# Patient Record
Sex: FEMALE | Race: WHITE | NOT HISPANIC OR LATINO | URBAN - NONMETROPOLITAN AREA
[De-identification: names, ages, dates, MRNs, and addresses within clinical notes are randomized per-mention and may not be internally consistent; named-entity substitution may affect disease eponyms.]

---

## 2017-08-25 ENCOUNTER — NEW PATIENT (OUTPATIENT)
Dept: URBAN - NONMETROPOLITAN AREA CLINIC 6 | Facility: CLINIC | Age: 47
End: 2017-08-25
Payer: COMMERCIAL

## 2017-08-25 DIAGNOSIS — H00.021 HORDEOLUM INTERNUM (MEIBOMIAN GLAND DYSFUNCTION), RIGHT UPPER LID: ICD-10-CM

## 2017-08-25 DIAGNOSIS — H00.024 HORDEOLUM INTERNUM (MEIBOMIAN GLAND DYSFUNCTION), LEFT UPPER LID: ICD-10-CM

## 2017-08-25 PROCEDURE — 92015 DETERMINE REFRACTIVE STATE: CPT | Performed by: OPTOMETRIST

## 2017-08-25 PROCEDURE — 92004 COMPRE OPH EXAM NEW PT 1/>: CPT | Performed by: OPTOMETRIST

## 2017-08-25 ASSESSMENT — VISUAL ACUITY
OS: 20/20
OD: 20/20

## 2017-08-25 ASSESSMENT — INTRAOCULAR PRESSURE
OD: 16
OS: 16

## 2017-08-25 ASSESSMENT — KERATOMETRY
OS: 46.36
OD: 46.20

## 2018-08-24 ENCOUNTER — FOLLOW UP ESTABLISHED (OUTPATIENT)
Dept: URBAN - NONMETROPOLITAN AREA CLINIC 6 | Facility: CLINIC | Age: 48
End: 2018-08-24
Payer: COMMERCIAL

## 2018-08-24 PROCEDURE — 92015 DETERMINE REFRACTIVE STATE: CPT | Performed by: OPTOMETRIST

## 2018-08-24 PROCEDURE — 92014 COMPRE OPH EXAM EST PT 1/>: CPT | Performed by: OPTOMETRIST

## 2018-08-24 ASSESSMENT — INTRAOCULAR PRESSURE
OS: 13
OD: 15

## 2018-08-24 ASSESSMENT — VISUAL ACUITY
OD: 20/20
OS: 20/20

## 2019-09-07 ENCOUNTER — FOLLOW UP ESTABLISHED (OUTPATIENT)
Dept: URBAN - NONMETROPOLITAN AREA CLINIC 6 | Facility: CLINIC | Age: 49
End: 2019-09-07
Payer: COMMERCIAL

## 2019-09-07 PROCEDURE — 92015 DETERMINE REFRACTIVE STATE: CPT | Performed by: OPTOMETRIST

## 2019-09-07 PROCEDURE — 92014 COMPRE OPH EXAM EST PT 1/>: CPT | Performed by: OPTOMETRIST

## 2019-09-07 ASSESSMENT — INTRAOCULAR PRESSURE
OS: 12
OD: 12

## 2019-09-07 ASSESSMENT — VISUAL ACUITY
OS: 20/20
OD: 20/20

## 2021-05-18 ENCOUNTER — OFFICE VISIT (OUTPATIENT)
Dept: URBAN - NONMETROPOLITAN AREA CLINIC 6 | Facility: CLINIC | Age: 51
End: 2021-05-18
Payer: COMMERCIAL

## 2021-05-18 DIAGNOSIS — H52.13 MYOPIA, BILATERAL: Primary | ICD-10-CM

## 2021-05-18 PROCEDURE — 92014 COMPRE OPH EXAM EST PT 1/>: CPT | Performed by: OPTOMETRIST

## 2021-05-18 ASSESSMENT — INTRAOCULAR PRESSURE
OD: 15
OS: 18

## 2021-05-18 ASSESSMENT — VISUAL ACUITY
OS: 20/20
OD: 20/20

## 2021-05-18 NOTE — IMPRESSION/PLAN
Impression: Myopia, bilateral: H52.13. Plan: Contact lens Rx updated. Reviewed proper wear and replacement of CL. Discussed the importance of taking CL out every night for optimal health of the eyes. Pt is ok with glasses she already has.

## 2023-04-17 ENCOUNTER — OFFICE VISIT (OUTPATIENT)
Dept: URBAN - NONMETROPOLITAN AREA CLINIC 6 | Facility: CLINIC | Age: 53
End: 2023-04-17
Payer: COMMERCIAL

## 2023-04-17 DIAGNOSIS — H52.13 MYOPIA, BILATERAL: Primary | ICD-10-CM

## 2023-04-17 PROCEDURE — 92310 CONTACT LENS FITTING OU: CPT | Performed by: OPTOMETRIST

## 2023-04-17 PROCEDURE — 92014 COMPRE OPH EXAM EST PT 1/>: CPT | Performed by: OPTOMETRIST

## 2023-04-17 ASSESSMENT — VISUAL ACUITY
OS: 20/20
OD: 20/20

## 2023-04-17 NOTE — IMPRESSION/PLAN
Impression: Myopia, bilateral: H52.13. Plan: Glasses Rx and Contact lens Rx updated. Discussed proper wearing schedule and replacement of Contact lenses. Call if any changes to vision occur. Dispensed [employer] Monthly trials. CL rx finalized in both brands for patient to decide which she likes better.

## 2024-04-17 ENCOUNTER — OFFICE VISIT (OUTPATIENT)
Dept: URBAN - NONMETROPOLITAN AREA CLINIC 6 | Facility: CLINIC | Age: 54
End: 2024-04-17
Payer: COMMERCIAL

## 2024-04-17 DIAGNOSIS — H52.13 MYOPIA, BILATERAL: Primary | ICD-10-CM

## 2024-04-17 DIAGNOSIS — H25.13 AGE-RELATED NUCLEAR CATARACT, BILATERAL: ICD-10-CM

## 2024-04-17 PROCEDURE — 92014 COMPRE OPH EXAM EST PT 1/>: CPT | Performed by: OPTOMETRIST

## 2024-04-17 PROCEDURE — 92310 CONTACT LENS FITTING OU: CPT | Performed by: OPTOMETRIST

## 2024-04-17 ASSESSMENT — KERATOMETRY
OS: 45.98
OD: 45.88

## 2024-04-17 ASSESSMENT — VISUAL ACUITY
OD: 20/20
OS: 20/20

## 2024-04-17 ASSESSMENT — INTRAOCULAR PRESSURE
OS: 13
OD: 13

## 2025-04-21 ENCOUNTER — OFFICE VISIT (OUTPATIENT)
Dept: URBAN - NONMETROPOLITAN AREA CLINIC 6 | Facility: CLINIC | Age: 55
End: 2025-04-21
Payer: COMMERCIAL

## 2025-04-21 DIAGNOSIS — H25.13 AGE-RELATED NUCLEAR CATARACT, BILATERAL: ICD-10-CM

## 2025-04-21 DIAGNOSIS — H52.13 MYOPIA, BILATERAL: Primary | ICD-10-CM

## 2025-04-21 PROCEDURE — 92014 COMPRE OPH EXAM EST PT 1/>: CPT | Performed by: OPTOMETRIST

## 2025-04-21 PROCEDURE — 92310 CONTACT LENS FITTING OU: CPT | Performed by: OPTOMETRIST

## 2025-04-21 ASSESSMENT — INTRAOCULAR PRESSURE
OS: 19
OD: 14

## 2025-04-21 ASSESSMENT — VISUAL ACUITY: OD: 20/20
